# Patient Record
Sex: FEMALE | Race: BLACK OR AFRICAN AMERICAN
[De-identification: names, ages, dates, MRNs, and addresses within clinical notes are randomized per-mention and may not be internally consistent; named-entity substitution may affect disease eponyms.]

---

## 2019-06-03 ENCOUNTER — HOSPITAL ENCOUNTER (INPATIENT)
Dept: HOSPITAL 26 - MED | Age: 84
LOS: 3 days | Discharge: HOME | DRG: 690 | End: 2019-06-06
Payer: MEDICARE

## 2019-06-03 VITALS — SYSTOLIC BLOOD PRESSURE: 184 MMHG | DIASTOLIC BLOOD PRESSURE: 86 MMHG

## 2019-06-03 VITALS — DIASTOLIC BLOOD PRESSURE: 58 MMHG | SYSTOLIC BLOOD PRESSURE: 168 MMHG

## 2019-06-03 VITALS — HEIGHT: 57 IN | WEIGHT: 105 LBS | BODY MASS INDEX: 22.65 KG/M2

## 2019-06-03 DIAGNOSIS — I12.9: ICD-10-CM

## 2019-06-03 DIAGNOSIS — E86.9: ICD-10-CM

## 2019-06-03 DIAGNOSIS — E44.1: ICD-10-CM

## 2019-06-03 DIAGNOSIS — N18.9: ICD-10-CM

## 2019-06-03 DIAGNOSIS — E87.0: ICD-10-CM

## 2019-06-03 DIAGNOSIS — F03.90: ICD-10-CM

## 2019-06-03 DIAGNOSIS — N17.9: ICD-10-CM

## 2019-06-03 DIAGNOSIS — N39.0: Primary | ICD-10-CM

## 2019-06-03 DIAGNOSIS — E03.9: ICD-10-CM

## 2019-06-03 DIAGNOSIS — E87.8: ICD-10-CM

## 2019-06-03 DIAGNOSIS — E87.6: ICD-10-CM

## 2019-06-03 LAB
ALBUMIN FLD-MCNC: 3.2 G/DL (ref 3.4–5)
ANION GAP SERPL CALCULATED.3IONS-SCNC: 9.8 MMOL/L (ref 8–16)
APPEARANCE UR: (no result)
AST SERPL-CCNC: 17 U/L (ref 15–37)
BASOPHILS # BLD AUTO: 0 K/UL (ref 0–0.22)
BASOPHILS NFR BLD AUTO: 0.8 % (ref 0–2)
BILIRUB SERPL-MCNC: 0.3 MG/DL (ref 0–1)
BILIRUB UR QL STRIP: NEGATIVE
BUN SERPL-MCNC: 17 MG/DL (ref 7–18)
CHLORIDE SERPL-SCNC: 110 MMOL/L (ref 98–107)
CO2 SERPL-SCNC: 30.1 MMOL/L (ref 21–32)
COLOR UR: YELLOW
CREAT SERPL-MCNC: 1.4 MG/DL (ref 0.6–1.3)
EOSINOPHIL # BLD AUTO: 0 K/UL (ref 0–0.4)
EOSINOPHIL NFR BLD AUTO: 0.3 % (ref 0–4)
ERYTHROCYTE [DISTWIDTH] IN BLOOD BY AUTOMATED COUNT: 20 % (ref 11.6–13.7)
GFR SERPL CREATININE-BSD FRML MDRD: (no result) ML/MIN (ref 90–?)
GLUCOSE SERPL-MCNC: 81 MG/DL (ref 74–106)
GLUCOSE UR STRIP-MCNC: NEGATIVE MG/DL
HCT VFR BLD AUTO: 31.2 % (ref 36–48)
HGB BLD-MCNC: 10.3 G/DL (ref 12–16)
HGB UR QL STRIP: (no result)
LEUKOCYTE ESTERASE UR QL STRIP: (no result)
LYMPHOCYTES # BLD AUTO: 1.3 K/UL (ref 2.5–16.5)
LYMPHOCYTES NFR BLD AUTO: 25.4 % (ref 20.5–51.1)
MCH RBC QN AUTO: 25 PG (ref 27–31)
MCHC RBC AUTO-ENTMCNC: 33 G/DL (ref 33–37)
MCV RBC AUTO: 75.2 FL (ref 80–94)
MONOCYTES # BLD AUTO: 0.4 K/UL (ref 0.8–1)
MONOCYTES NFR BLD AUTO: 8 % (ref 1.7–9.3)
NEUTROPHILS # BLD AUTO: 3.5 K/UL (ref 1.8–7.7)
NEUTROPHILS NFR BLD AUTO: 65.5 % (ref 42.2–75.2)
NITRITE UR QL STRIP: POSITIVE
PH UR STRIP: 6 [PH] (ref 5–9)
PLATELET # BLD AUTO: 153 K/UL (ref 140–450)
POTASSIUM SERPL-SCNC: 2.9 MMOL/L (ref 3.5–5.1)
PROTHROMBIN TIME: 10.2 SECS (ref 10.8–13.4)
RBC # BLD AUTO: 4.15 MIL/UL (ref 4.2–5.4)
RBC #/AREA URNS HPF: (no result) /HPF (ref 0–5)
SODIUM SERPL-SCNC: 147 MMOL/L (ref 136–145)
WBC # BLD AUTO: 5.3 K/UL (ref 4.8–10.8)

## 2019-06-03 PROCEDURE — 96375 TX/PRO/DX INJ NEW DRUG ADDON: CPT

## 2019-06-03 PROCEDURE — 96365 THER/PROPH/DIAG IV INF INIT: CPT

## 2019-06-03 PROCEDURE — G0378 HOSPITAL OBSERVATION PER HR: HCPCS

## 2019-06-03 PROCEDURE — 96368 THER/DIAG CONCURRENT INF: CPT

## 2019-06-03 PROCEDURE — 96367 TX/PROPH/DG ADDL SEQ IV INF: CPT

## 2019-06-03 PROCEDURE — 99285 EMERGENCY DEPT VISIT HI MDM: CPT

## 2019-06-03 PROCEDURE — C1758 CATHETER, URETERAL: HCPCS

## 2019-06-03 NOTE — NUR
PT LAYING IN BED, CAREGIVER AT BEDSIDE. VS NOTED, RR EVEN AND UNLABORED. DENIES 
ANY PAIN AT THIS TIME. ALL NEEDS MET.

## 2019-06-03 NOTE — NUR
RECEIVED PT FROM ER VIA [facility] PT AAOX2 COOPERATIVE TO FOLLOW COMMANDS ON TELEMETRY SB IV ON 
LEFT WRIST GAUGE #24 INFUSING POTASSIUM RELATIVES AT BED SIDE  SKIN IS INTACT,  [facility] NARES 
TAKEN AND SENT TO LAB PT  AND RELTIVES ARE ORIENTED TO THE FLOOR CALL LIGHT WITHIN REACH

## 2019-06-03 NOTE — NUR
VS TAKEN AT THE BEDSIDE, /95. PER PT SON, PT TAKES AMLODIPINE AT HOME 
DAILY, GAVE HER HOME MEDS. WILL CONTINUE TO [facility] PT.

## 2019-06-03 NOTE — NUR
Patient will be admitted to care of DR. PELLETIER. Admited to TELE.  Will go to 
room 121B. Belongings list completed.  Report to [facility].

## 2019-06-03 NOTE — NUR
RECEIVED REPORT FROM AM NURSE. PT LAYING IN BED, CAREGIVER AT BEDSIDE. VS 
NOTED, RR EVEN AND UNLABORED. DENIES ANY PAIN AT THIS TIME. ALL NEEDS MET.

## 2019-06-04 VITALS — SYSTOLIC BLOOD PRESSURE: 132 MMHG | DIASTOLIC BLOOD PRESSURE: 67 MMHG

## 2019-06-04 VITALS — SYSTOLIC BLOOD PRESSURE: 161 MMHG | DIASTOLIC BLOOD PRESSURE: 74 MMHG

## 2019-06-04 VITALS — DIASTOLIC BLOOD PRESSURE: 75 MMHG | SYSTOLIC BLOOD PRESSURE: 126 MMHG

## 2019-06-04 VITALS — DIASTOLIC BLOOD PRESSURE: 50 MMHG | SYSTOLIC BLOOD PRESSURE: 120 MMHG

## 2019-06-04 VITALS — DIASTOLIC BLOOD PRESSURE: 67 MMHG | SYSTOLIC BLOOD PRESSURE: 144 MMHG

## 2019-06-04 VITALS — DIASTOLIC BLOOD PRESSURE: 59 MMHG | SYSTOLIC BLOOD PRESSURE: 132 MMHG

## 2019-06-04 LAB
ALBUMIN FLD-MCNC: 3 G/DL (ref 3.4–5)
ANION GAP SERPL CALCULATED.3IONS-SCNC: 11.9 MMOL/L (ref 8–16)
AST SERPL-CCNC: 15 U/L (ref 15–37)
BASOPHILS # BLD AUTO: 0 K/UL (ref 0–0.22)
BASOPHILS NFR BLD AUTO: 0.4 % (ref 0–2)
BILIRUB SERPL-MCNC: 0.4 MG/DL (ref 0–1)
BUN SERPL-MCNC: 17 MG/DL (ref 7–18)
CHLORIDE SERPL-SCNC: 106 MMOL/L (ref 98–107)
CO2 SERPL-SCNC: 27.3 MMOL/L (ref 21–32)
CREAT SERPL-MCNC: 1.6 MG/DL (ref 0.6–1.3)
EOSINOPHIL # BLD AUTO: 0 K/UL (ref 0–0.4)
EOSINOPHIL NFR BLD AUTO: 0 % (ref 0–4)
ERYTHROCYTE [DISTWIDTH] IN BLOOD BY AUTOMATED COUNT: 19.6 % (ref 11.6–13.7)
GFR SERPL CREATININE-BSD FRML MDRD: (no result) ML/MIN (ref 90–?)
GLUCOSE SERPL-MCNC: 102 MG/DL (ref 74–106)
HCT VFR BLD AUTO: 31 % (ref 36–48)
HGB BLD-MCNC: 10.4 G/DL (ref 12–16)
LYMPHOCYTES # BLD AUTO: 1 K/UL (ref 2.5–16.5)
LYMPHOCYTES NFR BLD AUTO: 16.4 % (ref 20.5–51.1)
MCH RBC QN AUTO: 25 PG (ref 27–31)
MCHC RBC AUTO-ENTMCNC: 34 G/DL (ref 33–37)
MCV RBC AUTO: 75.5 FL (ref 80–94)
MONOCYTES # BLD AUTO: 0.5 K/UL (ref 0.8–1)
MONOCYTES NFR BLD AUTO: 8.2 % (ref 1.7–9.3)
NEUTROPHILS # BLD AUTO: 4.4 K/UL (ref 1.8–7.7)
NEUTROPHILS NFR BLD AUTO: 75 % (ref 42.2–75.2)
PLATELET # BLD AUTO: 148 K/UL (ref 140–450)
POTASSIUM SERPL-SCNC: 3.2 MMOL/L (ref 3.5–5.1)
RBC # BLD AUTO: 4.11 MIL/UL (ref 4.2–5.4)
SODIUM SERPL-SCNC: 142 MMOL/L (ref 136–145)
WBC # BLD AUTO: 5.8 K/UL (ref 4.8–10.8)

## 2019-06-04 RX ADMIN — POTASSIUM CHLORIDE SCH MEQ: 750 TABLET, FILM COATED, EXTENDED RELEASE ORAL at 10:14

## 2019-06-04 RX ADMIN — LEVOTHYROXINE SODIUM SCH MG: 88 TABLET ORAL at 10:04

## 2019-06-04 RX ADMIN — POTASSIUM CHLORIDE SCH MEQ: 750 TABLET, FILM COATED, EXTENDED RELEASE ORAL at 09:00

## 2019-06-04 RX ADMIN — POTASSIUM CHLORIDE SCH MEQ: 750 TABLET, FILM COATED, EXTENDED RELEASE ORAL at 10:03

## 2019-06-04 NOTE — NUR
RECEIVED PT FROM [facility] RN PT IS AAOX2 ON BED REST HL ON LEFT  WRIST PATENT  , ON TELEMETRY 
SR  NOT DISTRESS NTED  RELATIVES AT BED SIDE INITIAL ASSESSMENT DONE

## 2019-06-04 NOTE — NUR
RECEIVED PT AAOX2, CONFUSED. NO SOB NOTED. NO C/O PAIN AT THIS TIME. IV TO LT HAND PATENT 
AND INTACT. CHEST CLEAR. ABDOMEN SOFT, BOWEL SOUNDS PRESENT. NO EDEMA NOTED. INSTRUCTED PT 
TO CALL FOR ASSISTANCE, CALL LIGHT WITHIN REACH. PT PARTIAL VERBALIZED UNDERSTANDING. PT'S 
DAUGHTER AT THE BEDSIDE.

## 2019-06-04 NOTE — NUR
LATEST BP : 161/74 MMHG, HR: 58/MIN. PT ASYMPTOMATIC OF ELEVATED BP. NO SOB NOTED. NO 
COMPLAINTS MADE. PT'S FAMILY AT THE BEDSIDE. AS PER DAUGHTER, SHE DOES NOT WANT HER MOTHER 
TO HAVE THE HYDRALAZINE IV THAT NIGHT SHIFT GAVE LAST NIGHT. WILL INFORM DR. CARDENAS.

## 2019-06-04 NOTE — NUR
PT  VERBALIZED NEEDED, FOLOW COMMANDS  NOT DISTRESS NOTED ON TELE SR PT  WILL BE ENDORSED TO 
DAY SGIF NURSE FOR CONTINUITY OF CARE

## 2019-06-04 NOTE — NUR
PT CONSUMED 45% OF BREAKFAST SERVED FED BY DAUGHTER. PT NEEDS MORE ENCOURAGEMENT ON EATING 
HER MEALS.

## 2019-06-04 NOTE — NUR
PT AWAKE, TALKING TO HER DAUGHTER AT THE BEDSIDE. NO SOB NOTED. NO COMPLAINTS MADE. ENDORSED 
TO NEXT SHIFT NURSE FOR CONTINUITY OF CARE.

## 2019-06-05 VITALS — SYSTOLIC BLOOD PRESSURE: 204 MMHG | DIASTOLIC BLOOD PRESSURE: 81 MMHG

## 2019-06-05 VITALS — SYSTOLIC BLOOD PRESSURE: 135 MMHG | DIASTOLIC BLOOD PRESSURE: 74 MMHG

## 2019-06-05 VITALS — DIASTOLIC BLOOD PRESSURE: 60 MMHG | SYSTOLIC BLOOD PRESSURE: 150 MMHG

## 2019-06-05 VITALS — DIASTOLIC BLOOD PRESSURE: 72 MMHG | SYSTOLIC BLOOD PRESSURE: 142 MMHG

## 2019-06-05 VITALS — DIASTOLIC BLOOD PRESSURE: 66 MMHG | SYSTOLIC BLOOD PRESSURE: 124 MMHG

## 2019-06-05 VITALS — SYSTOLIC BLOOD PRESSURE: 167 MMHG | DIASTOLIC BLOOD PRESSURE: 73 MMHG

## 2019-06-05 LAB
ANION GAP SERPL CALCULATED.3IONS-SCNC: 10.8 MMOL/L (ref 8–16)
BASOPHILS # BLD AUTO: 0 K/UL (ref 0–0.22)
BASOPHILS NFR BLD AUTO: 0.6 % (ref 0–2)
BUN SERPL-MCNC: 19 MG/DL (ref 7–18)
CHLORIDE SERPL-SCNC: 107 MMOL/L (ref 98–107)
CO2 SERPL-SCNC: 26.8 MMOL/L (ref 21–32)
CREAT SERPL-MCNC: 1.6 MG/DL (ref 0.6–1.3)
EOSINOPHIL # BLD AUTO: 0 K/UL (ref 0–0.4)
EOSINOPHIL NFR BLD AUTO: 0.9 % (ref 0–4)
ERYTHROCYTE [DISTWIDTH] IN BLOOD BY AUTOMATED COUNT: 20 % (ref 11.6–13.7)
GFR SERPL CREATININE-BSD FRML MDRD: (no result) ML/MIN (ref 90–?)
GLUCOSE SERPL-MCNC: 80 MG/DL (ref 74–106)
HCT VFR BLD AUTO: 29.9 % (ref 36–48)
HGB BLD-MCNC: 9.9 G/DL (ref 12–16)
LYMPHOCYTES # BLD AUTO: 1.5 K/UL (ref 2.5–16.5)
LYMPHOCYTES NFR BLD AUTO: 27.6 % (ref 20.5–51.1)
MAGNESIUM SERPL-MCNC: 2 MG/DL (ref 1.8–2.4)
MCH RBC QN AUTO: 25 PG (ref 27–31)
MCHC RBC AUTO-ENTMCNC: 33 G/DL (ref 33–37)
MCV RBC AUTO: 74.9 FL (ref 80–94)
MONOCYTES # BLD AUTO: 0.5 K/UL (ref 0.8–1)
MONOCYTES NFR BLD AUTO: 8.8 % (ref 1.7–9.3)
NEUTROPHILS # BLD AUTO: 3.3 K/UL (ref 1.8–7.7)
NEUTROPHILS NFR BLD AUTO: 62.1 % (ref 42.2–75.2)
PHOSPHATE SERPL-MCNC: 3.4 MG/DL (ref 2.5–4.9)
PLATELET # BLD AUTO: 146 K/UL (ref 140–450)
POTASSIUM SERPL-SCNC: 3.6 MMOL/L (ref 3.5–5.1)
RBC # BLD AUTO: 3.99 MIL/UL (ref 4.2–5.4)
SODIUM SERPL-SCNC: 141 MMOL/L (ref 136–145)
T4 FREE SERPL-MCNC: 0.84 NG/DL (ref 0.76–1.46)
TSH SERPL DL<=0.05 MIU/L-ACNC: 43.6 UIU/ML (ref 0.34–3.74)
WBC # BLD AUTO: 5.3 K/UL (ref 4.8–10.8)

## 2019-06-05 RX ADMIN — LEVOTHYROXINE SODIUM SCH MG: 88 TABLET ORAL at 06:04

## 2019-06-05 NOTE — NUR
RECEIVED PT AAOX2, CONFUSED. NO SOB NOTED. NO C/O PAIN AT THIS TIME. IV TO LEFT HAND 24G 
PATENT AND INTACT, CURRENTLY SALINE LOCKED. LUNG SOUNDS CLEAR. ABDOMEN SOFT, BOWEL SOUNDS 
PRESENT. NO EDEMA NOTED. INSTRUCTED PT TO CALL FOR ASSISTANCE, CALL LIGHT WITHIN REACH. 
BEDSIDE COMMODE AT PT BEDSIDE. DISCUSSED POC WITH PT AND ORIENTED PT TO ENVIRONMENT. PT 
PARTIAL VERBALIZED UNDERSTANDING BUT REINFORCEMENT IS NEEDED. WILL MONITOR PT CLOSELY. BED 
IN LOW POSITION, CALL LIGHT WITHIN REACH.

## 2019-06-05 NOTE — NUR
PT SITTING AT EDGE OF BED. FAMILY AT BEDSIDE. PT DENIES PAIN OR ANY DISTRESS. WILL CONTINUE 
TO ROUND FREQUENTLY ON PT. BED IN LOWEST POSITION, CALL LIGHT WITHIN REACH.

## 2019-06-05 NOTE — NUR
PT RESTING IN BED WITH DAUGHTER AND GRANDDAUGHTER AT BEDSIDE. ALL NEEDS CURRENTLY MET. WILL 
CONTINUE TO ROUND FREQUENTLY ON PT.

## 2019-06-05 NOTE — NUR
PT SITTING IN BED WATCHING TV. ALL NEEDS CURRENTLY MET. NO DISTRESS OR PAIN NOTED. WILL 
CONTINUE TO ROUND FREQUENTLY ON PT.

## 2019-06-05 NOTE — NUR
ADMINISTERED MORNING MEDS TO PT. PT TOLERATED THEM WELL. PT DENIES PAIN OR DISTRESS. WILL 
CONTINUE TO ROUND FREQUENTLY. BED IN LOWEST POSITION, CALL LIGHT WITHIN REACH.

## 2019-06-05 NOTE — NUR
INITIAL ASSESSMENT DONE. VITALS WERE TAKEN. PATIENT IS IN STABLE CONDITION. WILL CONTINUE TO 
MONITOR

## 2019-06-05 NOTE — NUR
RECEIVED BEDSIDE REPORT FROM DAY SHIFT NURSE. PATIENT IS AWAKE, ALERT,AND COOPERATIVE. 
FAMILY AT BEDSIDE. RESPIRATION EVEN UNLABORED ON ROOM AIR. SKIN IS WARM AND DRY. DENIES 
PAIN. IV PATENT AND INTACT. PLAN OF CARE WAS DISCUSSED. ALL SAFETY MEASURES IN PLACE.  BED 
IS AT LOW POSITION. BEDSIDE COMMODE WITHIN REACH. CALL LIGHT WITHIN REACH AND INSTRUCTED 
PATIENT TO CALL FOR ASSISTANCE. WILL CONTINUE TO MONITOR.

## 2019-06-05 NOTE — NUR
PATIENT IN BED SLEEPING RESPIRATION EVEN UNLABORED ON ROOM AIR. NO DISTRESS NOTED. WILL 
CONTINUE TO MONITOR

## 2019-06-05 NOTE — NUR
PT SLEEPING  ON TELEMETRY SB REMAINSTABLE AT THIS TIME NOT SIGNS OF PAIN,PT  WILL BE 
ENDORSED TO DAY SHIFT NURSE FOR CONTINUITY OF CARE

## 2019-06-06 VITALS — DIASTOLIC BLOOD PRESSURE: 55 MMHG | SYSTOLIC BLOOD PRESSURE: 145 MMHG

## 2019-06-06 VITALS — DIASTOLIC BLOOD PRESSURE: 70 MMHG | SYSTOLIC BLOOD PRESSURE: 164 MMHG

## 2019-06-06 VITALS — SYSTOLIC BLOOD PRESSURE: 112 MMHG | DIASTOLIC BLOOD PRESSURE: 55 MMHG

## 2019-06-06 VITALS — DIASTOLIC BLOOD PRESSURE: 63 MMHG | SYSTOLIC BLOOD PRESSURE: 153 MMHG

## 2019-06-06 LAB
ANION GAP SERPL CALCULATED.3IONS-SCNC: 13.2 MMOL/L (ref 8–16)
BASOPHILS # BLD AUTO: 0 K/UL (ref 0–0.22)
BASOPHILS NFR BLD AUTO: 0.6 % (ref 0–2)
BUN SERPL-MCNC: 25 MG/DL (ref 7–18)
CHLORIDE SERPL-SCNC: 105 MMOL/L (ref 98–107)
CO2 SERPL-SCNC: 23.3 MMOL/L (ref 21–32)
CREAT SERPL-MCNC: 1.6 MG/DL (ref 0.6–1.3)
EOSINOPHIL # BLD AUTO: 0 K/UL (ref 0–0.4)
EOSINOPHIL NFR BLD AUTO: 0.7 % (ref 0–4)
ERYTHROCYTE [DISTWIDTH] IN BLOOD BY AUTOMATED COUNT: 19.2 % (ref 11.6–13.7)
GFR SERPL CREATININE-BSD FRML MDRD: (no result) ML/MIN (ref 90–?)
GLUCOSE SERPL-MCNC: 91 MG/DL (ref 74–106)
HCT VFR BLD AUTO: 28.6 % (ref 36–48)
HGB BLD-MCNC: 9.6 G/DL (ref 12–16)
LYMPHOCYTES # BLD AUTO: 1.1 K/UL (ref 2.5–16.5)
LYMPHOCYTES NFR BLD AUTO: 20.5 % (ref 20.5–51.1)
MAGNESIUM SERPL-MCNC: 2.1 MG/DL (ref 1.8–2.4)
MCH RBC QN AUTO: 25 PG (ref 27–31)
MCHC RBC AUTO-ENTMCNC: 34 G/DL (ref 33–37)
MCV RBC AUTO: 74.8 FL (ref 80–94)
MONOCYTES # BLD AUTO: 0.4 K/UL (ref 0.8–1)
MONOCYTES NFR BLD AUTO: 8 % (ref 1.7–9.3)
NEUTROPHILS # BLD AUTO: 3.9 K/UL (ref 1.8–7.7)
NEUTROPHILS NFR BLD AUTO: 70.2 % (ref 42.2–75.2)
PHOSPHATE SERPL-MCNC: 3.9 MG/DL (ref 2.5–4.9)
PLATELET # BLD AUTO: 158 K/UL (ref 140–450)
POTASSIUM SERPL-SCNC: 3.5 MMOL/L (ref 3.5–5.1)
RBC # BLD AUTO: 3.82 MIL/UL (ref 4.2–5.4)
SODIUM SERPL-SCNC: 138 MMOL/L (ref 136–145)
WBC # BLD AUTO: 5.6 K/UL (ref 4.8–10.8)

## 2019-06-06 RX ADMIN — LEVOTHYROXINE SODIUM SCH MG: 88 TABLET ORAL at 06:21

## 2019-06-06 NOTE — NUR
6/6/19 RD INITIAL ASSESSMENT COMPLETED



PLEASE REFER TO NUTRITION ASSESSMENT UNDER CARE ACTIVITY FOR ESTIMATED NUTRITIONAL NEEDS. 



1. CONTINUE CARDIAC DIET  

2. CONTINUE ENSURE BID 

3. EDUCATION ON POTASSIUM INTAKE WAS PROVIDED

4. RD TO FOLLOW-UP 5-7 DAYS, LOW RISK 



PHILLIP DELATORRE, RD

## 2019-06-06 NOTE — NUR
REPORT RECEIVED FROM NIGHT SHIFT NURSE, PT AROUSES EASILY OX1, RESP EVEN UNLABORED, SKIN 
WARM DRY COLOR WNL, DENIES SOB, DENIES PAIN, POC REVIEWED, DENIES ANY IMMEDIATE NEEDS, ALL 
SAFETY MEASURES IN PLACE, WILL CONTINUE TO MONITOR

## 2019-06-06 NOTE — NUR
SON, ANTONINA BUTCHER AT BEDSIDE, POC DISCUSSED, NUTRITIONIST PHILLIP CALLED FOR HYPOKALEMIA 
EDUCATION REQUESTED BY SON.

## 2019-06-06 NOTE — NUR
CHECKED PATIENT. PATIENT SLEEPING RESPIRATION EVEN UNLABORED ON ROOM AIR. NO DISTRESS NOTED. 
WILL CONTINUE TO MONITOR

## 2019-06-06 NOTE — NUR
IV DC'D, CATH TIP INTACT, BLEEDING CONTROLLED, PT DAVID WELL, DC INSTRUCTION GIVEN AND 
EXPLAINED TO PT'S SON ANTONINA, HE VERBALIZED FULL UNDERSTANDING, MINIMAL ASSIST NEED FOR 
CHANGING CLOTHES.  ESCORTED OUT TO CAR IN WHEELCHAIR BY CNA.  DC HOME WITH SON.

## 2019-09-23 ENCOUNTER — HOSPITAL ENCOUNTER (EMERGENCY)
Dept: HOSPITAL 1 - ED | Age: 84
Discharge: HOME | End: 2019-09-23
Payer: COMMERCIAL

## 2019-09-23 VITALS — WEIGHT: 100 LBS | HEIGHT: 63 IN | BODY MASS INDEX: 17.72 KG/M2

## 2019-09-23 VITALS — DIASTOLIC BLOOD PRESSURE: 69 MMHG | SYSTOLIC BLOOD PRESSURE: 156 MMHG

## 2019-09-23 DIAGNOSIS — I10: ICD-10-CM

## 2019-09-23 DIAGNOSIS — R53.1: ICD-10-CM

## 2019-09-23 DIAGNOSIS — R55: Primary | ICD-10-CM

## 2019-09-23 LAB
ALBUMIN SERPL-MCNC: 2.8 G/DL (ref 3.4–5)
ALP SERPL-CCNC: 53 U/L (ref 46–116)
ALT SERPL-CCNC: 9 U/L (ref 14–59)
AST SERPL-CCNC: 13 U/L (ref 15–37)
BASOPHILS NFR BLD: 0.1 % (ref 0–2)
BILIRUB SERPL-MCNC: 0.31 MG/DL (ref 0.2–1)
BUN SERPL-MCNC: 23 MG/DL (ref 7–18)
CALCIUM SERPL-MCNC: 8.1 MG/DL (ref 8.5–10.1)
CHLORIDE SERPL-SCNC: 109 MMOL/L (ref 98–107)
CO2 SERPL-SCNC: 22 MMOL/L (ref 21–32)
CREAT SERPL-MCNC: 1.5 MG/DL (ref 0.6–1)
ERYTHROCYTE [DISTWIDTH] IN BLOOD BY AUTOMATED COUNT: 16.5 % (ref 11.5–14.5)
GFR SERPLBLD BASED ON 1.73 SQ M-ARVRAT: (no result) ML/MIN
GLUCOSE SERPL-MCNC: 151 MG/DL (ref 74–106)
PLATELET # BLD: 178 X10^3MCL (ref 130–400)
POTASSIUM SERPL-SCNC: 3.3 MMOL/L (ref 3.5–5.1)
PROT SERPL-MCNC: 6.1 G/DL (ref 6.4–8.2)
SODIUM SERPL-SCNC: 142 MMOL/L (ref 136–145)